# Patient Record
Sex: FEMALE | Race: BLACK OR AFRICAN AMERICAN
[De-identification: names, ages, dates, MRNs, and addresses within clinical notes are randomized per-mention and may not be internally consistent; named-entity substitution may affect disease eponyms.]

---

## 2017-10-20 ENCOUNTER — HOSPITAL ENCOUNTER (EMERGENCY)
Dept: HOSPITAL 62 - ER | Age: 47
Discharge: HOME | End: 2017-10-20
Payer: SELF-PAY

## 2017-10-20 VITALS — SYSTOLIC BLOOD PRESSURE: 130 MMHG | DIASTOLIC BLOOD PRESSURE: 76 MMHG

## 2017-10-20 DIAGNOSIS — J02.9: Primary | ICD-10-CM

## 2017-10-20 PROCEDURE — 87070 CULTURE OTHR SPECIMN AEROBIC: CPT

## 2017-10-20 PROCEDURE — 99283 EMERGENCY DEPT VISIT LOW MDM: CPT

## 2017-10-20 PROCEDURE — 87880 STREP A ASSAY W/OPTIC: CPT

## 2017-10-20 NOTE — ER DOCUMENT REPORT
ED ENT





- General


Chief Complaint: Sore Throat


Stated Complaint: THROAT SWELLING


Time Seen by Provider: 10/20/17 08:46


Mode of Arrival: Ambulatory


Information source: Patient


Notes: 





Patient is a 46-year-old female who presents to the ER today for sore throat 2 

days.  Patient also admits to right ear pain, radiating from the throat."  

Patient denies any cough, fever, chills or other symptoms.


TRAVEL OUTSIDE OF THE [place] IN LAST 30 DAYS: No





- Related Data


Allergies/Adverse Reactions: 


 





metronidazole [From Flagyl] Allergy (Unknown, Verified 10/20/17 08:41)


 











Past Medical History





- General


Information source: Patient





- Social History


Smoking Status: Never Smoker


Chew tobacco use (# tins/day): No


Frequency of alcohol use: None


Drug Abuse: None


Family History: Reviewed & Not Pertinent


Patient has suicidal ideation: No


Patient has homicidal ideation: No


Renal/ Medical History: Denies: Hx Peritoneal Dialysis


Past Surgical History: Reports: Hx Breast Surgery - benign in left breast





- Immunizations


Hx Diphtheria, Pertussis, Tetanus Vaccination: Yes


Hx Pneumococcal Vaccination: 10/01/13





Review of Systems





- Review of Systems


Constitutional: No symptoms reported


EENT: See HPI


Cardiovascular: No symptoms reported


Respiratory: No symptoms reported


Gastrointestinal: No symptoms reported


Genitourinary: No symptoms reported


Female Genitourinary: No symptoms reported


Musculoskeletal: No symptoms reported


Skin: No symptoms reported


Hematologic/Lymphatic: No symptoms reported


Neurological/Psychological: No symptoms reported





Physical Exam





- Vital signs


Vitals: 





 











Temp Pulse Resp BP Pulse Ox


 


 97.5 F   78   14   130/76 H  100 


 


 10/20/17 08:37  10/20/17 08:37  10/20/17 08:37  10/20/17 08:37  10/20/17 08:37














- Notes


Notes: 





PHYSICAL EXAMINATION: 


GENERAL: Mildly ill-appearing, but in no acute distress. 


HEAD: Atraumatic, normocephalic. 


EYES: Pupils equal round and reactive to light, extraocular movements intact, 

sclera anicteric, conjunctiva are normal. 


ENT: ear canals without erythema or foreign body, TMs with air-fluid levels 

behind bilaterally, nares patent, oropharynx erythematous without enlarged 

tonsils and without exudates. Moist mucous membranes. 


NECK: Normal range of motion, supple without lymphadenopathy 


LUNGS: CTAB and equal. No wheezes rales or rhonchi. 


HEART: Regular rate and rhythm without murmurs


EXTREMITIES: Normal range of motion, no pitting edema. No cyanosis. 


NEUROLOGICAL: Cranial nerves grossly intact. Normal sensory/motor exams. 


PSYCH: Normal mood, normal affect. 


SKIN: Warm, Dry, normal turgor, no rashes or lesions noted 

















Course





- Re-evaluation


Re-evalutation: 





10/20/17 09:36


Strep negative today.  Throat culture sent.  Patient will be given magic 

mouthwash and Flonase.





- Vital Signs


Vital signs: 





 











Temp Pulse Resp BP Pulse Ox


 


 97.5 F   78   18   130/76 H  100 


 


 10/20/17 08:37  10/20/17 08:37  10/20/17 08:50  10/20/17 08:37  10/20/17 08:37














Discharge





- Discharge


Clinical Impression: 


 Acute viral pharyngitis





Condition: Stable


Disposition: HOME, SELF-CARE


Instructions:  Sore Throat ([facility])


Additional Instructions: 


Return immediately for any new or worsening symptoms.





Follow up with primary care provider, call tomorrow to make followup 

appointment.


Prescriptions: 


Fluticasone Propionate [Flonase Allergy Relief] 15.8 ml NS BID #1 spray.susp


Forms:  Return to Work

## 2018-09-23 ENCOUNTER — HOSPITAL ENCOUNTER (EMERGENCY)
Dept: HOSPITAL 62 - ER | Age: 48
Discharge: HOME | End: 2018-09-23
Payer: SELF-PAY

## 2018-09-23 VITALS — SYSTOLIC BLOOD PRESSURE: 123 MMHG | DIASTOLIC BLOOD PRESSURE: 63 MMHG

## 2018-09-23 DIAGNOSIS — Z88.0: ICD-10-CM

## 2018-09-23 DIAGNOSIS — M54.5: ICD-10-CM

## 2018-09-23 DIAGNOSIS — G89.29: ICD-10-CM

## 2018-09-23 DIAGNOSIS — D25.9: Primary | ICD-10-CM

## 2018-09-23 DIAGNOSIS — Z88.1: ICD-10-CM

## 2018-09-23 DIAGNOSIS — Z97.5: ICD-10-CM

## 2018-09-23 DIAGNOSIS — R03.0: ICD-10-CM

## 2018-09-23 LAB
ADD MANUAL DIFF: NO
ALBUMIN SERPL-MCNC: 4.9 G/DL (ref 3.5–5)
ALP SERPL-CCNC: 55 U/L (ref 38–126)
ALT SERPL-CCNC: 38 U/L (ref 9–52)
ANION GAP SERPL CALC-SCNC: 10 MMOL/L (ref 5–19)
APPEARANCE UR: (no result)
APTT PPP: YELLOW S
AST SERPL-CCNC: 24 U/L (ref 14–36)
BASOPHILS # BLD AUTO: 0 10^3/UL (ref 0–0.2)
BASOPHILS NFR BLD AUTO: 0.4 % (ref 0–2)
BILIRUB DIRECT SERPL-MCNC: 0.2 MG/DL (ref 0–0.4)
BILIRUB SERPL-MCNC: 0.4 MG/DL (ref 0.2–1.3)
BILIRUB UR QL STRIP: NEGATIVE
BUN SERPL-MCNC: 10 MG/DL (ref 7–20)
CALCIUM: 9.9 MG/DL (ref 8.4–10.2)
CHLORIDE SERPL-SCNC: 102 MMOL/L (ref 98–107)
CO2 SERPL-SCNC: 27 MMOL/L (ref 22–30)
EOSINOPHIL # BLD AUTO: 0 10^3/UL (ref 0–0.6)
EOSINOPHIL NFR BLD AUTO: 0.7 % (ref 0–6)
ERYTHROCYTE [DISTWIDTH] IN BLOOD BY AUTOMATED COUNT: 14.2 % (ref 11.5–14)
GLUCOSE SERPL-MCNC: 83 MG/DL (ref 75–110)
GLUCOSE UR STRIP-MCNC: NEGATIVE MG/DL
HCT VFR BLD CALC: 35.2 % (ref 36–47)
HGB BLD-MCNC: 11.6 G/DL (ref 12–15.5)
KETONES UR STRIP-MCNC: NEGATIVE MG/DL
LIPASE SERPL-CCNC: 373.2 U/L (ref 23–300)
LYMPHOCYTES # BLD AUTO: 2.4 10^3/UL (ref 0.5–4.7)
LYMPHOCYTES NFR BLD AUTO: 35.4 % (ref 13–45)
MCH RBC QN AUTO: 29.1 PG (ref 27–33.4)
MCHC RBC AUTO-ENTMCNC: 32.8 G/DL (ref 32–36)
MCV RBC AUTO: 89 FL (ref 80–97)
MONOCYTES # BLD AUTO: 0.6 10^3/UL (ref 0.1–1.4)
MONOCYTES NFR BLD AUTO: 9.3 % (ref 3–13)
NEUTROPHILS # BLD AUTO: 3.8 10^3/UL (ref 1.7–8.2)
NEUTS SEG NFR BLD AUTO: 54.2 % (ref 42–78)
NITRITE UR QL STRIP: NEGATIVE
PH UR STRIP: 8 [PH] (ref 5–9)
PLATELET # BLD: 279 10^3/UL (ref 150–450)
POTASSIUM SERPL-SCNC: 4.2 MMOL/L (ref 3.6–5)
PROT SERPL-MCNC: 8.4 G/DL (ref 6.3–8.2)
PROT UR STRIP-MCNC: NEGATIVE MG/DL
RBC # BLD AUTO: 3.97 10^6/UL (ref 3.72–5.28)
SODIUM SERPL-SCNC: 139.3 MMOL/L (ref 137–145)
SP GR UR STRIP: 1.01
TOTAL CELLS COUNTED % (AUTO): 100 %
UROBILINOGEN UR-MCNC: NEGATIVE MG/DL (ref ?–2)
WBC # BLD AUTO: 6.9 10^3/UL (ref 4–10.5)

## 2018-09-23 PROCEDURE — 99284 EMERGENCY DEPT VISIT MOD MDM: CPT

## 2018-09-23 PROCEDURE — 36415 COLL VENOUS BLD VENIPUNCTURE: CPT

## 2018-09-23 PROCEDURE — 96372 THER/PROPH/DIAG INJ SC/IM: CPT

## 2018-09-23 PROCEDURE — 85025 COMPLETE CBC W/AUTO DIFF WBC: CPT

## 2018-09-23 PROCEDURE — 81001 URINALYSIS AUTO W/SCOPE: CPT

## 2018-09-23 PROCEDURE — 76856 US EXAM PELVIC COMPLETE: CPT

## 2018-09-23 PROCEDURE — 83690 ASSAY OF LIPASE: CPT

## 2018-09-23 PROCEDURE — 80053 COMPREHEN METABOLIC PANEL: CPT

## 2018-09-23 NOTE — ER DOCUMENT REPORT
ED Medical Screen (RME)





- General


Chief Complaint: Abdominal Pain


Stated Complaint: BACK PAIN


Time Seen by Provider: 09/23/18 14:53


Mode of Arrival: Ambulatory


Information source: Patient


Notes: 





Patient is a 39-year-old female who presents with several complaints today.  

Patient reports she has a burning to her bilateral feet for 1 week.  Patient 

unknown why this is happening.  Patient also reports left lower quadrant pain 

4 days.  Patient reports she has a history of fibroids, she states she usually 

has pain after she answer.,  She ended her period approximately 1 week ago.  

Patient denies any nausea, vomiting, diarrhea or fevers.  Patient also denies 

any urinary symptoms whatsoever.





Exam:


Abdomen soft, nontender no guarding no rebound.


Patient alert, oriented and smiling during triage.





I have greeted and performed a rapid initial assessment of this patient.  A 

comprehensive ED assessment and evaluation of the patient, analysis of test 

results and completion of the medical decision making process will be conducted 

by additional ED providers.  Dictation of this chart was performed using voice 

recognition software; therefore, there may be some unintended grammatical 

errors.


TRAVEL OUTSIDE OF THE [place] IN LAST 30 DAYS: No





- Related Data


Allergies/Adverse Reactions: 


 





metronidazole [From Flagyl] Allergy (Unknown, Verified 09/23/18 14:49)


 


Penicillins Allergy (Verified 09/23/18 14:49)


 











Past Medical History





- Social History


Chew tobacco use (# tins/day): No


Frequency of alcohol use: None


Drug Abuse: None


Renal/ Medical History: Denies: Hx Peritoneal Dialysis


Past Surgical History: Reports: Hx Breast Surgery - benign in left breast





- Immunizations


Hx Diphtheria, Pertussis, Tetanus Vaccination: Yes





Physical Exam





- Vital signs


Vitals: 





 











Temp Pulse Resp BP Pulse Ox


 


 98.2 F   95   16   131/73 H  100 


 


 09/23/18 13:01  09/23/18 13:01  09/23/18 13:01  09/23/18 13:01  09/23/18 13:01














Course





- Vital Signs


Vital signs: 





 











Temp Pulse Resp BP Pulse Ox


 


 98.2 F   95   16   131/73 H  100 


 


 09/23/18 13:01  09/23/18 13:01  09/23/18 13:01  09/23/18 13:01  09/23/18 13:01

## 2018-09-23 NOTE — RADIOLOGY REPORT (SQ)
EXAM DESCRIPTION:  U/S NON-OB PELVIS W/O DOP



COMPLETED DATE/TIME:  9/23/2018 6:13 pm



REASON FOR STUDY:  LLQ pain hx of fibroids



COMPARISON:  None.



TECHNIQUE:  Dynamic and static grayscale images acquired of the pelvis via transabdominal approach an
d recorded on PACS. Additional selected color Doppler and spectral images recorded.



LIMITATIONS:  None.



FINDINGS:  UTERUS: Uterus is 12 x 8 x 9 cm size with multiple fibroids, the largest is along the vent
ral uterine body 4 cm in diameter, next largest is at the uterine fundus 2.7 cm size

ENDOMETRIAL STRIPE: IUD in place.  9 mm in thickness

CERVIX: No nabothian cysts.

RIGHT OVARY AND DOPPLER: Not visualized due to adnexal bowel gas

LEFT OVARY AND DOPPLER: Not visualized due to adnexal bowel gas

FREE FLUID: None noted.

OTHER: No other significant finding.



IMPRESSION:  Fibroid uterus.

IUD in the endometrial canal

Ovaries not visualized

No free pelvic fluid



TECHNICAL DOCUMENTATION:  JOB ID:  [id number]

 2011 [employer]- All Rights Reserved                           Rev-[id number]



Reading location - IP/workstation name: [email or web address]

## 2018-09-23 NOTE — ER DOCUMENT REPORT
ED General





- General


Chief Complaint: Abdominal Pain


Stated Complaint: BACK PAIN


Time Seen by Provider: 09/23/18 14:53


Mode of Arrival: Ambulatory


Information source: Patient, Pending sale to [facility] Records


Notes: 





47-year-old female with no reported past medical history presents with 

complaint of low back pain and suprapubic tenderness.  Patient states that 

symptoms started 2 days prior to arrival.  She states that she has had prior 

similar symptoms with her periods.  Her last menstrual period was last week.  

She does have a history of heavy menstrual bleeding secondary to fibroids.  She 

does report a fall approximately 1 month ago landing on her buttocks but states 

that she had no pain after that.  Patient has been taking Tylenol for pain.  

She denies any fever, chills, chest pain, shortness of breath, abdominal pain, 

vaginal discharge.  She denies concern for STD.


TRAVEL OUTSIDE OF THE [place] IN LAST 30 DAYS: No





- HPI


Onset: Other - 2 days prior to arrival


Onset/Duration: Gradual, Intermittent


Quality of pain: Achy


Severity: Mild


Associated symptoms: denies: Chest pain, Chills, Diarrhea, Fever, Nausea, 

Vomiting, Shortness of breath


Exacerbated by: Denies


Relieved by: Denies


Similar symptoms previously: Yes





- Related Data


Allergies/Adverse Reactions: 


 





metronidazole [From Flagyl] Allergy (Unknown, Verified 09/23/18 14:49)


 


Penicillins Allergy (Verified 09/23/18 14:49)


 











Past Medical History





- General


Information source: Patient





- Social History


Smoking Status: Never Smoker


Chew tobacco use (# tins/day): No


Frequency of alcohol use: None


Drug Abuse: None


Family History: Reviewed & Not Pertinent


Patient has suicidal ideation: No


Patient has homicidal ideation: No





- Medical History


Medical History: Negative


Renal/ Medical History: Denies: Hx Peritoneal Dialysis


Past Surgical History: Reports: Hx Breast Surgery - benign in left breast





- Immunizations


Hx Diphtheria, Pertussis, Tetanus Vaccination: Yes


Hx Pneumococcal Vaccination: 10/01/13





Review of Systems





- Review of Systems


Notes: 





REVIEW OF SYSTEMS:


CONSTITUTIONAL :  Denies fever,  chills, or sweats.  Denies recent illness. 

Denies weight loss, recent hospitalizations. 


EENT: Denies visual changes, eye pain.  Denies sore throat, oral lesions, 

difficulty swallowing.


CARDIOVASCULAR:  Denies chest pain.  Denies palpitations. Denies lower 

extremity edema.


RESPIRATORY:  Denies cough. Denies shortness of breath, wheezing.


GASTROINTESTINAL:  Denies abdominal  distention.  Denies nausea, vomiting, or 

diarrhea.  Denies blood in vomitus, stools, or per rectum.  Denies black, tarry 

stools.  Denies constipation.  


GENITOURINARY:  Denies difficulty urinating, painful urination,  frequency, 

blood in urine, or  vaginal discharge.


MUSCULOSKELETAL:  Denies  neck pain or stiffness.  Denies joint pain or 

swelling.


SKIN:   Denies rash, lesions or sores.


HEMATOLOGIC :   Denies easy bruising or bleeding.


LYMPHATIC:  Denies swollen glands.


NEUROLOGICAL:  Denies confusion or altered mental status.  Denies loss of 

consciousness.  Denies dizziness or lightheadedness.  Denies headache.  Denies 

weakness or paralysis.  Denies problems difficulty with ambulation, slurred 

speech.  Denies sensory loss, numbness, or tingling.  Denies seizures.


PSYCHIATRIC:  Denies anxiety or stress.  Denies depression, suicidal ideation, 

or homicidal ideation.  Denies visual or auditory hallucinations.








Physical Exam





- Vital signs


Vitals: 


 











Temp Pulse Resp BP Pulse Ox


 


 98.2 F   95   16   131/73 H  100 


 


 09/23/18 13:01  09/23/18 13:01  09/23/18 13:01  09/23/18 13:01  09/23/18 13:01











Interpretation: Hypertensive





- Notes


Notes: 





PHYSICAL EXAMINATION:





GENERAL: Well-appearing, well-nourished and in no acute distress.





HEAD: Atraumatic, normocephalic.





EYES: Pupils equal round and reactive to light, extraocular movements intact, 

conjunctiva are normal.





ENT: Nares patent, oropharynx clear without exudates.  Moist mucous membranes.





NECK: Normal range of motion, supple without lymphadenopathy





LUNGS: Breath sounds clear to auscultation bilaterally and equal.  No wheezes 

rales or rhonchi.





HEART: Regular rate and rhythm without murmurs





ABDOMEN: Soft, nontender, nondistended abdomen.  No guarding, no rebound.  No 

masses appreciated.





Female : Patient declines





Musculoskeletal: Normal range of motion, no pitting or edema.  No cyanosis.  

Tenderness to palpation along the musculature of the lumbar spine on the left.  

No midline tenderness.  5/5 strength in dorsi and plantar flexion.  Sensation 

intact.





NEUROLOGICAL: Cranial nerves grossly intact.  Normal speech, normal gait.  

Normal sensory, motor exams





PSYCH: Normal mood, normal affect.





SKIN: Warm, Dry, normal turgor, no rashes or lesions noted.





Course





- Re-evaluation


Re-evalutation: 








Laboratory











  09/23/18 09/23/18 09/23/18





  15:10 15:10 15:10


 


WBC  6.9  


 


RBC  3.97  


 


Hgb  11.6 L  


 


Hct  35.2 L  


 


MCV  89  


 


MCH  29.1  


 


MCHC  32.8  


 


RDW  14.2 H  


 


Plt Count  279  


 


Seg Neutrophils %  54.2  


 


Lymphocytes %  35.4  


 


Monocytes %  9.3  


 


Eosinophils %  0.7  


 


Basophils %  0.4  


 


Absolute Neutrophils  3.8  


 


Absolute Lymphocytes  2.4  


 


Absolute Monocytes  0.6  


 


Absolute Eosinophils  0.0  


 


Absolute Basophils  0.0  


 


Sodium   139.3 


 


Potassium   4.2 


 


Chloride   102 


 


Carbon Dioxide   27 


 


Anion Gap   10 


 


BUN   10 


 


Creatinine   0.61 


 


Est GFR ( Amer)   > 60 


 


Est GFR (Non-Af Amer)   > 60 


 


Glucose   83 


 


Calcium   9.9 


 


Total Bilirubin   0.4 


 


Direct Bilirubin   0.2 


 


Neonat Total Bilirubin   Not Reportable 


 


Neonat Direct Bilirubin   Not Reportable 


 


Neonat Indirect Bili   Not Reportable 


 


AST   24 


 


ALT   38 


 


Alkaline Phosphatase   55 


 


Total Protein   8.4 H 


 


Albumin   4.9 


 


Lipase   373.2 H 


 


Urine Color    YELLOW


 


Urine Appearance    SLIGHTLY-CLOUDY


 


Urine pH    8.0


 


Ur Specific Dunbar    1.011


 


Urine Protein    NEGATIVE


 


Urine Glucose (UA)    NEGATIVE


 


Urine Ketones    NEGATIVE


 


Urine Blood    NEGATIVE


 


Urine Nitrite    NEGATIVE


 


Urine Bilirubin    NEGATIVE


 


Urine Urobilinogen    NEGATIVE


 


Ur Leukocyte Esterase    TRACE H


 


Urine WBC (Auto)    2


 


Urine RBC (Auto)    1


 


Squamous Epi Cells Auto    2


 


Amorphous Sediment Auto    TRACE


 


Urine Mucus (Auto)    OCC


 


Urine Ascorbic Acid    NEGATIVE














 





Pelvis Ultrasound  09/23/18 14:57


IMPRESSION:  Fibroid uterus.


IUD in the endometrial canal


Ovaries not visualized


No free pelvic fluid


 











09/23/18 19:26


Presentation of a well appearing patient complaining of acute on chronic back 

pain. No rapid progression of symptoms, systemic symptoms including fevers, 

chills, weight loss, history of recent bacterial infection, bilateral symptoms, 

numbness, weakness, difficulty walking, urinary retention or bowel incontinence

, personal history of cancer, immunosuppression, diabetes, known AAA, or 

history of IV drug use.  Exam is without point tenderness over vertebral bodies

, pulsatile abdominal mass, and patient has symmetric and intact lower 

extremity strength, sensation, and reflexes without clonus. 2+ symmetric medial 

malleolar and dorsalis pedis pulses








Based on history and physical, I have a very low suspicion of a concerning 

etiology of pain including epidural compression syndrome, spinal infection,  

intra-abdominal process.  Patient's abdominal pain likely secondary to fibroids 

found in the uterus.  Patient has experienced these symptoms before with her 

menstrual period.  Due to absence of concerning risk factors in history and 

physical as well as absence of rapidly progressive, severe, or bilateral 

symptoms, will defer imaging at this point.  





Plan to manage conservatively with outpatient analgesia, analgesia, and 

physical therapy.  


- Acetaminophen 650 q 4 + ibuprofen 600 q 6


- Continue normal daily activities as tolerated by pain


- Provide with standard musculoskeletal back pain exercise instructions


- Instruct to follow up with primary care provider if symptoms not improving


- Provide careful return precautions and concerning symptoms to watch for.


09/25/18 13:28








- Vital Signs


Vital signs: 


 











Temp Pulse Resp BP Pulse Ox


 


 99 F   79   18   123/63   100 


 


 09/23/18 19:40  09/23/18 19:40  09/23/18 19:40  09/23/18 19:40  09/23/18 19:40














- Laboratory


Result Diagrams: 


 09/23/18 15:10





 09/23/18 15:10


Laboratory results interpreted by me: 


 











  09/23/18 09/23/18 09/23/18





  15:10 15:10 15:10


 


Hgb  11.6 L  


 


Hct  35.2 L  


 


RDW  14.2 H  


 


Total Protein   8.4 H 


 


Lipase   373.2 H 


 


Ur Leukocyte Esterase    TRACE H














- Diagnostic Test


Radiology reviewed: Image reviewed, Reports reviewed





Discharge





- Discharge


Clinical Impression: 


 Elevated blood pressure reading





Fibroid uterus


Qualifiers:


 Uterine leiomyoma location: unspecified location Qualified Code(s): D25.9 - 

Leiomyoma of uterus, unspecified





Low back pain


Qualifiers:


 Chronicity: unspecified Back pain laterality: left Sciatica presence: without 

sciatica Qualified Code(s): M54.5 - Low back pain





Condition: Good


Disposition: HOME, SELF-CARE


Instructions:  Abdominal Pain ([facility]), Low Back Pain ([facility])


Additional Instructions: 


You have been seen in the Emergency Department (ED)  today for back pain.  Your 

workup and exam have not shown any acute abnormalities and you are likely 

suffering from muscle strain or possible problems with your discs, but there is 

no treatment that will fix your symptoms at this time.  Please take the 

naproxen that has been prescribed as directed. You should also purchase a local 

lidocaine cream such as "aspercreme with lidocaine" and use per bottle 

instructions to the affected area. Apply heat to the area as often as you are 

able. Continue to keep active and avoid prolonged periods of bed rest.





Please follow up with your doctor as soon as possible regarding today's ED 

visit and your back pain.  Return to the ED for worsening back pain, fever, 

weakness or numbness of either leg, or if you develop either (1) an inability 

to urinate or have bowel movements, or (2) loss of your ability to control your 

bathroom functions (if you start having "accidents"), or if you develop other 

new symptoms that concern you.concern you.





Your labs today did not show any evidence of anemia.  Your glucose was normal.  

Your urinalysis did not show infection.  Your ultrasound of your pelvis showed 

fibroids which you are already aware of.  Please follow-up with OB/GYN.








Regarding Blood Pressure:


 


Your blood pressure was noted to be greater than 120/80 at least once in the 

emergency room today.  


It is recommended that you follow-up with her primary care physician in the 

next week for repeat blood pressure check.





The Centers for Medicare and Medicaid Services has specific recommendations 

regarding a person's blood pressure.  There are several lifestyle modifications 

that are recommended in order to help lower your blood pressure.


These include:


 


Quitting smoking if you smoke.


Reducing the amount of sodium in your diet.


Getting regular exercise


Limiting alcohol to no more than 2 drinks a day for men and one drink a day for 

women.


Eating a healthy diet, including more fruits and vegetables, low fat dairy 

products, less saturated and total fat.


Losing weight if you are overweight.


 


FOLLOW-UP:


  Call your doctor's office and let them know your blood pressure was elevated 

and you were advised to get your blood pressure checked in the above time-line.

   If you are unable to get into your doctor's office in this time period, you 

can follow-up with a new physician (I have left the numbers below for a few 

primary care doctors affiliated with this hospital) or return to the ER.


 


PRIMARY CARE PHYSICIANS:


Dr. Guillaume Juarez 


[address], [place] [address] [phone]


 


Dr Mariscal


Address: [address] [place] [address] [place] [address] 


[phone]


 


Dr Valentin


Address: [address] [facility] , [place] [address] 


[phone]


 





Prescriptions: 


Naproxen [Naprosyn] 500 mg PO BID #20 tablet


Forms:  Elevated Blood Pressure

## 2018-12-06 ENCOUNTER — HOSPITAL ENCOUNTER (OUTPATIENT)
Dept: HOSPITAL 62 - RAD | Age: 48
End: 2018-12-06
Attending: FAMILY MEDICINE
Payer: COMMERCIAL

## 2018-12-06 DIAGNOSIS — D17.0: Primary | ICD-10-CM

## 2018-12-06 PROCEDURE — 76536 US EXAM OF HEAD AND NECK: CPT

## 2018-12-06 NOTE — RADIOLOGY REPORT (SQ)
EXAM DESCRIPTION:  U/S THYROID/SFT TISS HD   NECK



COMPLETED DATE/TIME:  12/6/2018 10:58 am



REASON FOR STUDY:  PALPABLE MASS OF NECK (R22.1) R22.1  LOCALIZED SWELLING, MASS AND LUMP, NECK



COMPARISON:  None.



TECHNIQUE:  Dynamic and static grayscale images acquired of the localized site of clinical concern an
d recorded on PACS. Additional selected color Doppler and spectral images recorded.

SITE OF CONCERN: Posterior left neck.



LIMITATIONS:  None.



FINDINGS:  There is a well-circumscribed subcutaneous mass measuring 3.1 x 2.6 x 0.8 cm.  Echogenic c
apsule and no internal flow on color Doppler.



IMPRESSION:  Benign lipoma.



TECHNICAL DOCUMENTATION:  JOB ID:  [id number]

 2011 [employer]- All Rights Reserved



Reading location - IP/workstation name: [email or web address]

## 2018-12-11 ENCOUNTER — HOSPITAL ENCOUNTER (OUTPATIENT)
Dept: HOSPITAL 62 - OD | Age: 48
End: 2018-12-11
Attending: FAMILY MEDICINE
Payer: COMMERCIAL

## 2018-12-11 DIAGNOSIS — Z13.220: ICD-10-CM

## 2018-12-11 DIAGNOSIS — Z13.1: Primary | ICD-10-CM

## 2018-12-11 LAB
ALBUMIN SERPL-MCNC: 4.9 G/DL (ref 3.5–5)
ALP SERPL-CCNC: 58 U/L (ref 38–126)
ALT SERPL-CCNC: 36 U/L (ref 9–52)
ANION GAP SERPL CALC-SCNC: 13 MMOL/L (ref 5–19)
AST SERPL-CCNC: 29 U/L (ref 14–36)
BILIRUB DIRECT SERPL-MCNC: 0.1 MG/DL (ref 0–0.4)
BILIRUB SERPL-MCNC: 0.4 MG/DL (ref 0.2–1.3)
BUN SERPL-MCNC: 8 MG/DL (ref 7–20)
CALCIUM: 10 MG/DL (ref 8.4–10.2)
CHLORIDE SERPL-SCNC: 101 MMOL/L (ref 98–107)
CO2 SERPL-SCNC: 26 MMOL/L (ref 22–30)
GLUCOSE SERPL-MCNC: 101 MG/DL (ref 75–110)
LDLC SERPL DIRECT ASSAY-MCNC: 140 MG/DL (ref ?–100)
POTASSIUM SERPL-SCNC: 4.4 MMOL/L (ref 3.6–5)
PROT SERPL-MCNC: 8 G/DL (ref 6.3–8.2)
SODIUM SERPL-SCNC: 140.4 MMOL/L (ref 137–145)
TRIGL SERPL-MCNC: 114 MG/DL (ref ?–150)
VLDLC SERPL CALC-MCNC: 23 MG/DL (ref 10–31)

## 2018-12-11 PROCEDURE — 80061 LIPID PANEL: CPT

## 2018-12-11 PROCEDURE — 36415 COLL VENOUS BLD VENIPUNCTURE: CPT

## 2018-12-11 PROCEDURE — 80053 COMPREHEN METABOLIC PANEL: CPT

## 2019-09-24 ENCOUNTER — HOSPITAL ENCOUNTER (OUTPATIENT)
Dept: HOSPITAL 62 - RAD | Age: 49
End: 2019-09-24
Attending: SURGERY
Payer: COMMERCIAL

## 2019-09-24 DIAGNOSIS — R22.1: Primary | ICD-10-CM

## 2019-09-24 PROCEDURE — 76536 US EXAM OF HEAD AND NECK: CPT

## 2019-09-24 NOTE — RADIOLOGY REPORT (SQ)
EXAM DESCRIPTION:  U/S THYROID/SFT TISS HD   NECK



COMPLETED DATE/TIME:  9/24/2019 8:10 am



REASON FOR STUDY:  SELLING MASS AND LUMP, NECK (R22.1) R22.1  LOCALIZED SWELLING, MASS AND LUMP, NECK




COMPARISON:  None.



TECHNIQUE:  Dynamic and static gray-scale images acquired of the thyroid gland. Selected additional c
olor/power Doppler images recorded.  All images stored to PACS.



LIMITATIONS:  None.



FINDINGS:  RIGHT LOBE: The right lobe of the thyroid gland measures 4.9 x 1.6 x 1.9 cm in its echotex
ture is homogeneous. There are no solid or cystic masses.

LEFT LOBE: The left lobe of the thyroid gland measures 4 x 1.7 x 1.3 cm and its echotexture is homoge
neous.  There are no solid or cystic masses.

ISTHMUS: The isthmus of the thyroid gland measures 2.4 mm in AP diameter and its echotexture is homog
eneous.  There are no solid or cystic masses.

OTHER:  No other finding.



IMPRESSION:  Normal sonographic appearance of the thyroid gland.



TECHNICAL DOCUMENTATION:  JOB ID:  [id number]

 2011 [employer]- All Rights Reserved



Reading location - IP/workstation name: [email or web address]

## 2019-10-03 ENCOUNTER — HOSPITAL ENCOUNTER (OUTPATIENT)
Dept: HOSPITAL 62 - WI | Age: 49
End: 2019-10-03
Attending: FAMILY MEDICINE
Payer: COMMERCIAL

## 2019-10-03 DIAGNOSIS — Z12.31: Primary | ICD-10-CM

## 2019-10-03 PROCEDURE — 77063 BREAST TOMOSYNTHESIS BI: CPT

## 2019-10-03 NOTE — WOMENS IMAGING REPORT
EXAM DESCRIPTION:  P[facility] 3D BILAT SCREEN



COMPLETED DATE/TIME:  10/3/2019 8:04 am



REASON FOR STUDY:  Z12.31 ENCOUNTER FOR SCREENING MAMMOGRAM FOR MALIGNANT NEOPLASM OF BREAST Z12.31  
ENCNTR SCREEN MAMMOGRAM FOR MALIGNANT NEOPLASM OF JOCELYNE



COMPARISON:  2013, 2014



EXAM PARAMETERS:  Standard craniocaudal and mediolateral oblique views of each breast recorded using 
digital acquisition and breast tomosynthesis.

Read with the assistance of CAD.

.[facility] - R2  Version 9.2



LIMITATIONS:  None.



FINDINGS:  Findings present which are benign by mammographic criteria. No suspicious masses, calcific
ations or architectural distortion.

Pertinent benign findings: Stable benign right breast calcification and 5 mm benign right breast nodu
le at the 12 to 1 o'clock position.

Benign mammographic findings may include one or more of the following: Smooth masses, popcorn/rim/coa
rse calcifications, asymmetries, post-procedure changes, and lesions with long-standing stability.



IMPRESSION:  BENIGN MAMMOGRAPHIC FINDINGS.  BIRADS 2



BREAST DENSITY:  c. The breasts are heterogeneously dense, which may obscure small masses.



BIRAD:  ASSESSMENT:  2 BENIGN FINDING(S)



RECOMMENDATION:  ROUTINE SCREENING

Please continue yearly bilateral screening mammography/tomosynthesis in October 2020



COMMENT:  The patient has been notified of the results by letter per MQSA requirements. Additional no
tification policies are in place for contacting patient with suspicious or incomplete findings.

Quality ID #[id number]: The American College of Radiology recommends an annual screening mammogram for women
 aged 40 years or over. This facility utilizes a reminder system to ensure that all patients receive 
reminder letters, and/or direct phone calls for appointments. This includes reminders for routine scr
eening mammograms, diagnostic mammograms, or other Breast Imaging Interventions when appropriate.  Th
is patient will be placed in the appropriate reminder system.



TECHNICAL DOCUMENTATION:  FINDING NUMBER: (1)

ASSESSMENT:  (1)

JOB ID:  [id number]

 2011 [employer]- All Rights Reserved



Reading location - IP/workstation name: [email or web address]